# Patient Record
Sex: FEMALE | Race: WHITE | HISPANIC OR LATINO | Employment: UNEMPLOYED | URBAN - METROPOLITAN AREA
[De-identification: names, ages, dates, MRNs, and addresses within clinical notes are randomized per-mention and may not be internally consistent; named-entity substitution may affect disease eponyms.]

---

## 2022-02-09 ENCOUNTER — APPOINTMENT (EMERGENCY)
Dept: RADIOLOGY | Facility: HOSPITAL | Age: 72
End: 2022-02-09
Payer: COMMERCIAL

## 2022-02-09 ENCOUNTER — HOSPITAL ENCOUNTER (EMERGENCY)
Facility: HOSPITAL | Age: 72
Discharge: HOME | End: 2022-02-09
Attending: EMERGENCY MEDICINE | Admitting: EMERGENCY MEDICINE
Payer: COMMERCIAL

## 2022-02-09 VITALS
RESPIRATION RATE: 20 BRPM | TEMPERATURE: 97.6 F | SYSTOLIC BLOOD PRESSURE: 183 MMHG | HEIGHT: 60 IN | WEIGHT: 125 LBS | HEART RATE: 86 BPM | BODY MASS INDEX: 24.54 KG/M2 | DIASTOLIC BLOOD PRESSURE: 84 MMHG | OXYGEN SATURATION: 98 %

## 2022-02-09 DIAGNOSIS — M85.80 OSTEOPENIA: ICD-10-CM
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
## 2022-02-09 DIAGNOSIS — S23.8XXA SPRAIN OF CHEST WALL, INITIAL ENCOUNTER: ICD-10-CM

## 2022-02-09 DIAGNOSIS — S23.9XXA THORACIC BACK SPRAIN, INITIAL ENCOUNTER: ICD-10-CM

## 2022-02-09 PROCEDURE — 72072 X-RAY EXAM THORAC SPINE 3VWS: CPT

## 2022-02-09 PROCEDURE — 99284 EMERGENCY DEPT VISIT MOD MDM: CPT | Performed by: EMERGENCY MEDICINE

## 2022-02-09 PROCEDURE — 99284 EMERGENCY DEPT VISIT MOD MDM: CPT

## 2022-02-09 PROCEDURE — 72128 CT CHEST SPINE W/O DYE: CPT

## 2022-02-09 PROCEDURE — G1004 CDSM NDSC: HCPCS

## 2022-02-09 PROCEDURE — 71101 X-RAY EXAM UNILAT RIBS/CHEST: CPT

## 2022-02-09 PROCEDURE — 72100 X-RAY EXAM L-S SPINE 2/3 VWS: CPT

## 2022-02-09 PROCEDURE — 96372 THER/PROPH/DIAG INJ SC/IM: CPT

## 2022-02-09 RX ORDER — LIDOCAINE 50 MG/G
1 PATCH TOPICAL ONCE
Status: DISCONTINUED | OUTPATIENT
Start: 2022-02-09 | End: 2022-02-09 | Stop reason: HOSPADM

## 2022-02-09 RX ORDER — MONTELUKAST SODIUM 10 MG/1
10 TABLET ORAL
COMMUNITY

## 2022-02-09 RX ORDER — HYDROXYZINE HYDROCHLORIDE 25 MG/1
25 TABLET, FILM COATED ORAL EVERY 6 HOURS PRN
COMMUNITY

## 2022-02-09 RX ORDER — IBUPROFEN 800 MG/1
800 TABLET ORAL 3 TIMES DAILY
Qty: 21 TABLET | Refills: 0 | Status: SHIPPED | OUTPATIENT
Start: 2022-02-09

## 2022-02-09 RX ORDER — KETOROLAC TROMETHAMINE 30 MG/ML
30 INJECTION, SOLUTION INTRAMUSCULAR; INTRAVENOUS ONCE
Status: COMPLETED | OUTPATIENT
Start: 2022-02-09 | End: 2022-02-09

## 2022-02-09 RX ORDER — LISINOPRIL 5 MG/1
10 TABLET ORAL
COMMUNITY

## 2022-02-09 RX ORDER — LIDOCAINE 50 MG/G
1 PATCH TOPICAL DAILY
Qty: 15 PATCH | Refills: 0 | Status: SHIPPED | OUTPATIENT
Start: 2022-02-09

## 2022-02-09 RX ORDER — FLUTICASONE PROPIONATE 110 UG/1
2 AEROSOL, METERED RESPIRATORY (INHALATION) 2 TIMES DAILY
COMMUNITY

## 2022-02-09 RX ORDER — CYCLOBENZAPRINE HCL 10 MG
10 TABLET ORAL 2 TIMES DAILY PRN
Qty: 20 TABLET | Refills: 0 | Status: SHIPPED | OUTPATIENT
Start: 2022-02-09

## 2022-02-09 RX ADMIN — KETOROLAC TROMETHAMINE 30 MG: 30 INJECTION, SOLUTION INTRAMUSCULAR at 10:27

## 2022-02-09 RX ADMIN — LIDOCAINE 5% 1 PATCH: 700 PATCH TOPICAL at 10:26

## 2022-02-09 NOTE — DISCHARGE INSTRUCTIONS
Return to the ER for further concerns or worsening symptoms  Follow up with your primary care physician  and Orthopedics in 1-2 days  Take medication as prescribed
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
## 2023-01-18 ENCOUNTER — OFFICE VISIT (OUTPATIENT)
Dept: URGENT CARE | Facility: CLINIC | Age: 73
End: 2023-01-18

## 2023-01-18 VITALS
BODY MASS INDEX: 24.22 KG/M2 | WEIGHT: 124 LBS | OXYGEN SATURATION: 98 % | RESPIRATION RATE: 16 BRPM | HEART RATE: 75 BPM | DIASTOLIC BLOOD PRESSURE: 77 MMHG | SYSTOLIC BLOOD PRESSURE: 171 MMHG | TEMPERATURE: 98.4 F

## 2023-01-18 DIAGNOSIS — R05.1 ACUTE COUGH: Primary | ICD-10-CM

## 2023-01-18 NOTE — PROGRESS NOTES
[facility]        NAME: Antonia Xiao is a 67 y o  female  : 1950    MRN: [id number]  DATE: 2023  TIME: 1:55 PM    Assessment and Plan   Acute cough [R05 1]  1  Acute cough  Cov/Flu-Collected at [facility]        Supportive care as discussed  Discussed strict return to care precautions as well as red flag symptoms which should prompt immediate ED referral  Pt verbalized understanding and is in agreement with plan  Please follow up with your primary care provider within the next week  Please remember that your visit today was with an urgent care provider and should not replace follow up with your primary care provider for chronic medical issues or annual physicals  Patient Instructions       Follow up with PCP in 3-5 days  Proceed to  ER if symptoms worsen  Chief Complaint     Chief Complaint   Patient presents with   • Cough     For several days has had a headache, chills and a cough         History of Present Illness       Antonia Xiao is a(n) 67 y o  female presenting with URI symptoms x 3 days  Past medical history: HTN, HLD, anxiety, depression  Congestion: yes  Sore throat: no  Cough: yes  Sputum production: no  Fever: yes, chills only  Body aches: yes  Loss of smell/taste: no  GI symptoms: no  Known sick contacts: yes, daughter sick with same  OTC meds tried: delsym, vicks  Vaccinated against COVID19: yes        Review of Systems   Review of Systems   Constitutional:        Negative except as noted in HPI   Respiratory: Negative for shortness of breath  Cardiovascular: Negative for chest pain           Current Medications       Current Outpatient Medications:   •  cyclobenzaprine (FLEXERIL) 10 mg tablet, Take 1 tablet (10 mg total) by mouth 2 (two) times a day as needed for muscle spasms, Disp: 20 tablet, Rfl: 0  •  fluticasone (FLOVENT HFA) 110 MCG/ACT inhaler, Inhale 2 puffs 2 (two) times a day Rinse mouth after use , Disp: , Rfl:   •  hydrOXYzine HCL (ATARAX) 25 mg tablet, Take 25 mg by mouth every 6 (six) hours as needed for itching, Disp: , Rfl:   •  ibuprofen (MOTRIN) 800 mg tablet, Take 1 tablet (800 mg total) by mouth 3 (three) times a day, Disp: 21 tablet, Rfl: 0  •  lisinopril (ZESTRIL) 5 mg tablet, Take 10 mg by mouth daily at bedtime, Disp: , Rfl:   •  montelukast (SINGULAIR) 10 mg tablet, Take 10 mg by mouth daily at bedtime, Disp: , Rfl:   •  lidocaine (Lidoderm) 5 %, Apply 1 patch topically daily Remove & Discard patch within 12 hours or as directed by MD, Disp: 15 patch, Rfl: 0    Current Allergies     Allergies as of 01/18/2023   • (No Known Allergies)            The following portions of the patient's history were reviewed and updated as appropriate: allergies, current medications, past family history, past medical history, past social history, past surgical history and problem list      Past Medical History:   Diagnosis Date   • Anxiety depression   • Hyperlipidemia    • Hypertension    • Psychiatric disorder        Past Surgical History:   Procedure Laterality Date   • CHOLECYSTECTOMY     • HYSTERECTOMY     • SHOULDER ARTHROSCOPY Left        History reviewed  No pertinent family history  Medications have been verified  Objective   BP (!) 171/77   Pulse 75   Temp 98 4 °F (36 9 °C)   Resp 16   Wt 56 2 kg (124 lb)   SpO2 98%   BMI 24 22 kg/m²        Physical Exam     Physical Exam  Vitals and nursing note reviewed  Constitutional:       General: She is not in acute distress  Appearance: Normal appearance  She is not toxic-appearing  HENT:      Head: Normocephalic and atraumatic  Right Ear: Tympanic membrane, ear canal and external ear normal       Left Ear: Tympanic membrane, ear canal and external ear normal       Nose: No congestion  Mouth/Throat:      Mouth: Mucous membranes are moist       Pharynx: Oropharynx is clear  No oropharyngeal exudate or posterior oropharyngeal erythema     Eyes: Conjunctiva/sclera: Conjunctivae normal       Pupils: Pupils are equal, round, and reactive to light  Cardiovascular:      Rate and Rhythm: Normal rate and regular rhythm  Heart sounds: Normal heart sounds  Pulmonary:      Effort: Pulmonary effort is normal  No respiratory distress  Breath sounds: Normal breath sounds  No wheezing, rhonchi or rales  Abdominal:      General: Abdomen is flat  Palpations: Abdomen is soft  Musculoskeletal:      Cervical back: Normal range of motion and neck supple  Skin:     General: Skin is warm and dry  Capillary Refill: Capillary refill takes less than 2 seconds  Neurological:      Mental Status: She is alert and oriented to person, place, and time     Psychiatric:         Behavior: Behavior normal

## 2023-01-19 LAB
FLUAV RNA RESP QL NAA+PROBE: NEGATIVE
FLUBV RNA RESP QL NAA+PROBE: NEGATIVE
SARS-COV-2 RNA RESP QL NAA+PROBE: NEGATIVE